# Patient Record
Sex: FEMALE | Race: ASIAN | NOT HISPANIC OR LATINO
[De-identification: names, ages, dates, MRNs, and addresses within clinical notes are randomized per-mention and may not be internally consistent; named-entity substitution may affect disease eponyms.]

---

## 2022-03-07 PROBLEM — Z00.00 ENCOUNTER FOR PREVENTIVE HEALTH EXAMINATION: Status: ACTIVE | Noted: 2022-03-07

## 2022-03-08 ENCOUNTER — APPOINTMENT (OUTPATIENT)
Dept: CARDIOLOGY | Facility: CLINIC | Age: 72
End: 2022-03-08
Payer: MEDICARE

## 2022-03-08 VITALS
BODY MASS INDEX: 26.5 KG/M2 | RESPIRATION RATE: 18 BRPM | OXYGEN SATURATION: 97 % | TEMPERATURE: 97.7 F | HEART RATE: 65 BPM | DIASTOLIC BLOOD PRESSURE: 67 MMHG | WEIGHT: 161 LBS | HEIGHT: 65.35 IN | SYSTOLIC BLOOD PRESSURE: 116 MMHG

## 2022-03-08 DIAGNOSIS — R55 SYNCOPE AND COLLAPSE: ICD-10-CM

## 2022-03-08 PROCEDURE — 93306 TTE W/DOPPLER COMPLETE: CPT

## 2022-03-08 PROCEDURE — 99204 OFFICE O/P NEW MOD 45 MIN: CPT

## 2022-03-21 ENCOUNTER — FORM ENCOUNTER (OUTPATIENT)
Age: 72
End: 2022-03-21

## 2022-03-28 PROBLEM — R55 NEAR SYNCOPE: Status: ACTIVE | Noted: 2022-03-08

## 2022-04-12 ENCOUNTER — NON-APPOINTMENT (OUTPATIENT)
Age: 72
End: 2022-04-12

## 2022-10-24 ENCOUNTER — APPOINTMENT (OUTPATIENT)
Dept: CARDIOLOGY | Facility: CLINIC | Age: 72
End: 2022-10-24

## 2022-10-24 VITALS
RESPIRATION RATE: 18 BRPM | HEART RATE: 66 BPM | BODY MASS INDEX: 26.33 KG/M2 | SYSTOLIC BLOOD PRESSURE: 146 MMHG | DIASTOLIC BLOOD PRESSURE: 74 MMHG | HEIGHT: 64.96 IN | OXYGEN SATURATION: 100 % | WEIGHT: 158 LBS | TEMPERATURE: 97.3 F

## 2022-10-24 DIAGNOSIS — E78.5 HYPERLIPIDEMIA, UNSPECIFIED: ICD-10-CM

## 2022-10-24 PROCEDURE — 99214 OFFICE O/P EST MOD 30 MIN: CPT | Mod: 25

## 2022-10-24 PROCEDURE — 93015 CV STRESS TEST SUPVJ I&R: CPT

## 2022-10-24 NOTE — HISTORY OF PRESENT ILLNESS
[FreeTextEntry1] : 72 year-old female with HTN, HLD, breast CA (stage 0), osteoporosis, presents for evaluation of abnormal ECG.  Patient reports no cardiac history.  Patient was noted to have an abnormal ECG by PCP, showing inverted T waves V2.  Patient denies CP, SOB, palpitations, or lightheadedness.  Patient denies h/o syncope.  Patient reports episodes of dizziness, described as loss of balance, occurring 3 times a week.  I advised patient to undergo an echocardiogram.  I advised patient to wear a 7-day monitor.

## 2022-10-31 PROBLEM — E78.5 HLD (HYPERLIPIDEMIA): Status: ACTIVE | Noted: 2022-03-28

## 2023-03-02 ENCOUNTER — APPOINTMENT (OUTPATIENT)
Dept: CARDIOLOGY | Facility: CLINIC | Age: 73
End: 2023-03-02
Payer: MEDICARE

## 2023-03-02 VITALS
SYSTOLIC BLOOD PRESSURE: 122 MMHG | DIASTOLIC BLOOD PRESSURE: 74 MMHG | OXYGEN SATURATION: 96 % | HEART RATE: 62 BPM | BODY MASS INDEX: 25.82 KG/M2 | WEIGHT: 155 LBS | RESPIRATION RATE: 18 BRPM

## 2023-03-02 DIAGNOSIS — I10 ESSENTIAL (PRIMARY) HYPERTENSION: ICD-10-CM

## 2023-03-02 PROCEDURE — 99214 OFFICE O/P EST MOD 30 MIN: CPT

## 2023-03-02 RX ORDER — LOSARTAN POTASSIUM 100 MG/1
100 TABLET, FILM COATED ORAL DAILY
Qty: 30 | Refills: 5 | Status: ACTIVE | COMMUNITY
Start: 2023-03-02 | End: 1900-01-01

## 2023-03-02 RX ORDER — LOSARTAN POTASSIUM AND HYDROCHLOROTHIAZIDE 12.5; 1 MG/1; MG/1
100-12.5 TABLET ORAL DAILY
Qty: 30 | Refills: 5 | Status: DISCONTINUED | COMMUNITY
Start: 2022-10-18 | End: 2023-03-02

## 2023-03-02 NOTE — HISTORY OF PRESENT ILLNESS
[FreeTextEntry1] : 3/2/23 - Pt reports 3 episodes of dizziness, described as spinning, while walking on the street. She had to hold one something to prevent falling. It usually last a few minutes. It's associated w. nausea sometimes. Pt reports stable DUQUE. Pt denies CP. Her BP is 100-.  I advised patient to stop Losartan 100 -12.5  mg and only take Losartan 100 mg. \par \par 10/24/22 - Patient reports SOB going upstairs.  Patient denies CP.  Patient denies palpitations.  Recent  with triglyceride 368.   Patient underwent a treadmill stress test and completed 5.5 minutes of Emmanuel protocol.  There was no ECG evidence of ischemia.  Following treadmill stress, there was no echocardiographic evidence of ischemia. \par \par 3/8/22  - Patient reports no cardiac history.  Patient was noted to have an abnormal ECG by PCP, showing inverted T waves V2.  Patient denies CP, SOB, palpitations, or lightheadedness.  Patient denies h/o syncope.  Patient reports episodes of dizziness, described as loss of balance, occurring 3 times a week.  I advised patient to undergo an echocardiogram.  Patient underwent an echocardiogram and it showed normal LV function without significant valvular pathology.   I advised patient to wear a 7-day monitor.  Patient wore a 7-day monitor and it showed Average HR 63, rare PVCs, rare PACs, couplets, and 3 PATs, the longest 7 beats at a rate of 130, the fastest 4 beats at a rate of 136. Pt verbalized understanding. \par

## 2023-03-02 NOTE — HISTORY OF PRESENT ILLNESS
[FreeTextEntry1] : 10/24/22 - Patient reports SOB going upsatirs.  Patient denies CP.  Patient denies palpitations.  Recent  with triglyceride 368.   Patient underwent a treadmill stress test and completed 5.5 minutes of Emmanuel protocol.  There was no ECG evidence of ischemia.  Following treadmill stress, there was no echocardiographic evidence of ischemia. \par \par 3/8/22  - Patient reports no cardiac history.  Patient was noted to have an abnormal ECG by PCP, showing inverted T waves V2.  Patient denies CP, SOB, palpitations, or lightheadedness.  Patient denies h/o syncope.  Patient reports episodes of dizziness, described as loss of balance, occurring 3 times a week.  I advised patient to undergo an echocardiogram.  Patient underwent an echocardiogram and it showed normal LV function without significant valvular pathology.   I advised patient to wear a 7-day monitor.  Patient wore a 7-day monitor and it showed Average HR 63, rare PVCs, rare PACs, couplets, and 3 PATs, the longest 7 beats at a rate of 130, the fastest 4 beats at a rate of 136. Pt verbalized understanding. \par

## 2023-03-02 NOTE — REASON FOR VISIT
[FreeTextEntry1] : 72 year-old female with HTN, HLD, breast CA (stage 0), osteoporosis, presents for followup.  \par \par Patient was last seen on 10/24/22 for followup.  Patient reported SOB going upstairs.  Patient underwent a treadmill stress test and completed 5.5 minutes of Emmanuel protocol.  There was no ECG evidence of ischemia.  Following treadmill stress, there was no echocardiographic evidence of ischemia. \par \par She is on Plavix 75 mg for cardioprotection.  She is on Losartan HCTZ 100-25 mg for HTN.  She is on Rosuvastatin 5 mg and Fenofibrate 134 mg for HLD.  \par \par Patient underwent an echocardiogram 3/8/22 and it showed normal LV function without significant valvular pathology.  \par \par Patient wore a 7-day monitor 3/8/22 and it showed Average HR 63, rare PVCs, rare PACs, couplets, and 3 PATs, the longest 7 beats at a rate of 130, the fastest 4 beats at a rate of 136.  \par \par

## 2023-03-02 NOTE — REASON FOR VISIT
[FreeTextEntry1] : 72 year-old female with HTN, HLD, breast CA (stage 0), osteoporosis, presents for followup.  \par \par Patient was last seen on 3/8/22 for evaluation of abnormal ECG.   I advised patient to undergo an echocardiogram.  Patient underwent an echocardiogram and it showed normal LV function without significant valvular pathology.   I advised patient to wear a 7-day monitor.  Patient wore a 7-day monitor and it showed Average HR 63, rare PVCs, rare PACs, couplets, and 3 PATs, the longest 7 beats at a rate of 130, the fastest 4 beats at a rate of 136.  \par \par She is on Plavix 75 mg for cardioprotection.  She is on Losartan HCTZ 100-25 mg for HTN.  She is on Rosuvastatin 5 mg and Fenofibrate 134 mg for HLD.

## 2023-03-15 ENCOUNTER — FORM ENCOUNTER (OUTPATIENT)
Age: 73
End: 2023-03-15

## 2023-03-24 ENCOUNTER — NON-APPOINTMENT (OUTPATIENT)
Age: 73
End: 2023-03-24

## 2023-07-12 ENCOUNTER — APPOINTMENT (OUTPATIENT)
Dept: CARDIOLOGY | Facility: CLINIC | Age: 73
End: 2023-07-12
Payer: MEDICARE

## 2023-07-12 VITALS
DIASTOLIC BLOOD PRESSURE: 77 MMHG | SYSTOLIC BLOOD PRESSURE: 146 MMHG | BODY MASS INDEX: 24.99 KG/M2 | HEART RATE: 67 BPM | WEIGHT: 150 LBS | TEMPERATURE: 96.8 F | OXYGEN SATURATION: 97 % | RESPIRATION RATE: 16 BRPM

## 2023-07-12 DIAGNOSIS — R07.9 CHEST PAIN, UNSPECIFIED: ICD-10-CM

## 2023-07-12 DIAGNOSIS — R06.02 SHORTNESS OF BREATH: ICD-10-CM

## 2023-07-12 DIAGNOSIS — Z86.79 PERSONAL HISTORY OF OTHER DISEASES OF THE CIRCULATORY SYSTEM: ICD-10-CM

## 2023-07-12 DIAGNOSIS — R42 DIZZINESS AND GIDDINESS: ICD-10-CM

## 2023-07-12 PROCEDURE — 99214 OFFICE O/P EST MOD 30 MIN: CPT

## 2023-07-22 PROBLEM — R06.02 SOB (SHORTNESS OF BREATH): Status: ACTIVE | Noted: 2023-07-22

## 2023-07-22 PROBLEM — Z86.79 HISTORY OF ABNORMAL ELECTROCARDIOGRAPHY: Status: RESOLVED | Noted: 2022-03-28 | Resolved: 2023-07-22

## 2023-07-22 PROBLEM — R07.9 CHEST PAIN, UNSPECIFIED TYPE: Status: ACTIVE | Noted: 2023-07-22

## 2023-07-22 PROBLEM — R42 DIZZINESS: Status: ACTIVE | Noted: 2022-03-28

## 2023-07-22 NOTE — HISTORY OF PRESENT ILLNESS
[FreeTextEntry1] : 7/12/23 - Patient had brain CHERRY which showed 3 mm L cavernous carotid aneurysm. Patient may stop Plavix 75 mg. \par \par 3/2/23 - Pt reports 3 episodes of dizziness, described as spinning, while walking on the street. She had to hold one something to prevent falling. It usually last a few minutes. It's associated w. nausea sometimes. Pt reports stable DUQUE. Pt denies CP. Her BP is 100-.  I advised patient to stop Losartan 100 -12.5  mg and only take Losartan 100 mg. \par \par 10/24/22 - Patient reports SOB going upstairs.  Patient denies CP.  Patient denies palpitations.  Recent  with triglyceride 368.   Patient underwent a treadmill stress test and completed 5.5 minutes of Emmanuel protocol.  There was no ECG evidence of ischemia.  Following treadmill stress, there was no echocardiographic evidence of ischemia. \par \par 3/8/22  - Patient reports no cardiac history.  Patient was noted to have an abnormal ECG by PCP, showing inverted T waves V2.  Patient denies CP, SOB, palpitations, or lightheadedness.  Patient denies h/o syncope.  Patient reports episodes of dizziness, described as loss of balance, occurring 3 times a week.  I advised patient to undergo an echocardiogram.  Patient underwent an echocardiogram and it showed normal LV function without significant valvular pathology.   I advised patient to wear a 7-day monitor.  Patient wore a 7-day monitor and it showed Average HR 63, rare PVCs, rare PACs, couplets, and 3 PATs, the longest 7 beats at a rate of 130, the fastest 4 beats at a rate of 136. Pt verbalized understanding. \par

## 2023-07-22 NOTE — PHYSICAL EXAM
[Well Nourished] : well nourished [Well Developed] : well developed [No Acute Distress] : no acute distress [Normal Conjunctiva] : normal conjunctiva [Normal Venous Pressure] : normal venous pressure [No Carotid Bruit] : no carotid bruit [Normal S1, S2] : normal S1, S2 [No Murmur] : no murmur [No Rub] : no rub [No Gallop] : no gallop [Clear Lung Fields] : clear lung fields [No Respiratory Distress] : no respiratory distress  [Good Air Entry] : good air entry [Soft] : abdomen soft [Non Tender] : non-tender [No Masses/organomegaly] : no masses/organomegaly [Normal Bowel Sounds] : normal bowel sounds [Normal Gait] : normal gait [No Edema] : no edema [No Cyanosis] : no cyanosis [No Clubbing] : no clubbing [No Varicosities] : no varicosities [Moves all extremities] : moves all extremities [No Focal Deficits] : no focal deficits [Normal Speech] : normal speech [Alert and Oriented] : alert and oriented [Normal memory] : normal memory

## 2024-08-05 PROBLEM — R91.8 MULTIPLE LUNG NODULES: Status: ACTIVE | Noted: 2024-08-05

## 2024-08-07 ENCOUNTER — NON-APPOINTMENT (OUTPATIENT)
Age: 74
End: 2024-08-07

## 2024-08-08 ENCOUNTER — APPOINTMENT (OUTPATIENT)
Dept: THORACIC SURGERY | Facility: CLINIC | Age: 74
End: 2024-08-08

## 2024-08-08 PROBLEM — Z80.1 FAMILY HISTORY OF LUNG CANCER: Status: ACTIVE | Noted: 2024-08-08

## 2024-08-08 PROBLEM — Z77.22 HISTORY OF SECOND HAND SMOKE EXPOSURE: Status: ACTIVE | Noted: 2024-08-08

## 2024-08-08 PROBLEM — Z85.3 HISTORY OF MALIGNANT NEOPLASM OF LEFT FEMALE BREAST, UNSPECIFIED ESTROGEN RECEPTOR STATUS, UNSPECIFIED SITE OF BREAST: Status: RESOLVED | Noted: 2024-08-08 | Resolved: 2024-08-08

## 2024-08-08 PROCEDURE — 99204 OFFICE O/P NEW MOD 45 MIN: CPT

## 2024-08-08 NOTE — PHYSICAL EXAM
[Restricted in physically strenuous activity but ambulatory and able to carry out work of a light or sedentary nature] : Status 1- Restricted in physically strenuous activity but ambulatory and able to carry out work of a light or sedentary nature, e.g., light house work, office work [General Appearance - Alert] : alert [General Appearance - In No Acute Distress] : in no acute distress [Sclera] : the sclera and conjunctiva were normal [PERRL With Normal Accommodation] : pupils were equal in size, round, and reactive to light [Extraocular Movements] : extraocular movements were intact [Outer Ear] : the ears and nose were normal in appearance [Oropharynx] : the oropharynx was normal [Neck Appearance] : the appearance of the neck was normal [Neck Cervical Mass (___cm)] : no neck mass was observed [Jugular Venous Distention Increased] : there was no jugular-venous distention [Thyroid Diffuse Enlargement] : the thyroid was not enlarged [Thyroid Nodule] : there were no palpable thyroid nodules [Auscultation Breath Sounds / Voice Sounds] : lungs were clear to auscultation bilaterally [Heart Rate And Rhythm] : heart rate was normal and rhythm regular [Heart Sounds] : normal S1 and S2 [Heart Sounds Gallop] : no gallops [Murmurs] : no murmurs [Heart Sounds Pericardial Friction Rub] : no pericardial rub [Examination Of The Chest] : the chest was normal in appearance [Chest Visual Inspection Thoracic Asymmetry] : no chest asymmetry [Diminished Respiratory Excursion] : normal chest expansion [2+] : left 2+ [Bowel Sounds] : normal bowel sounds [Abdomen Soft] : soft [Abdomen Tenderness] : non-tender [Abdomen Mass (___ Cm)] : no abdominal mass palpated [Cervical Lymph Nodes Enlarged Posterior Bilaterally] : posterior cervical [Cervical Lymph Nodes Enlarged Anterior Bilaterally] : anterior cervical [Supraclavicular Lymph Nodes Enlarged Bilaterally] : supraclavicular [No CVA Tenderness] : no ~M costovertebral angle tenderness [No Spinal Tenderness] : no spinal tenderness [Abnormal Walk] : normal gait [Nail Clubbing] : no clubbing  or cyanosis of the fingernails [Musculoskeletal - Swelling] : no joint swelling seen [Motor Tone] : muscle strength and tone were normal [Skin Color & Pigmentation] : normal skin color and pigmentation [Skin Turgor] : normal skin turgor [] : no rash

## 2024-08-09 NOTE — ASSESSMENT
[FreeTextEntry1] : Ms. LU KUNZ, 73 year old female, never smoker, w/ hx of HTN (on Plavix), DMT2, Stg 0 breast CA s/p Lt breast lumpectomy followed by Targeted therapy x 5 yrs, followed by Hem/Onc Dr. Anderson Díaz, who presented with increasing size lung nodules, referred by PCP Dr. Paul Dougherty.   I have reviewed the patient's medical records and diagnostic images at time of this office consultation and have made the following recommendation: 1. CT scan reviewed, few small lung nodules that are stable from May 2023, I recommended continuous surveillance, RTC in 1 year with CT Chest w/o contrast.    I, CAMMY Hines, personally performed the evaluation and management (E/M) services for this established patient who presents today with (a) new problem(s)/exacerbation of (an) existing condition(s). That E/M includes conducting the examination, assessing all new/exacerbated conditions, and establishing a new plan of care. Today, my ACP, Inge Blakely NP was here to observe my evaluation and management services for this new problem/exacerbated condition to be followed going forward.

## 2024-08-09 NOTE — CONSULT LETTER
[Dear  ___] : Dear  [unfilled], [Consult Letter:] : I had the pleasure of evaluating your patient, [unfilled]. [( Thank you for referring [unfilled] for consultation for _____ )] : Thank you for referring [unfilled] for consultation for [unfilled] [Please see my note below.] : Please see my note below. [Consult Closing:] : Thank you very much for allowing me to participate in the care of this patient.  If you have any questions, please do not hesitate to contact me. [Sincerely,] : Sincerely, [DrMelania  ___] : Dr. BARLOW [FreeTextEntry2] : Dr. Paul Dougherty (PCP/Referring) Dr. Anderson Díaz (Hem/Onc) [FreeTextEntry3] : Joselo Joshua MD, MPH  System Director of Thoracic Surgery  Director of Comprehensive Lung and Foregut [place]  Professor Cardiovascular & Thoracic Surgery   [employer] at [facility] [phone] [place] [address] [place] [address] Tel: [phone] Fax: [phone]

## 2024-08-09 NOTE — CONSULT LETTER
[Dear  ___] : Dear  [unfilled], [Consult Letter:] : I had the pleasure of evaluating your patient, [unfilled]. [( Thank you for referring [unfilled] for consultation for _____ )] : Thank you for referring [unfilled] for consultation for [unfilled] [Please see my note below.] : Please see my note below. [Consult Closing:] : Thank you very much for allowing me to participate in the care of this patient.  If you have any questions, please do not hesitate to contact me. [Sincerely,] : Sincerely, [DrMelania  ___] : Dr. BARLOW [FreeTextEntry2] : Dr. Paul Dougherty (PCP/Referring) Dr. Anderson Díaz (Hem/Onc) [FreeTextEntry3] : Joselo Joshua MD, MPH  System Director of Thoracic Surgery  Director of Comprehensive Lung and Foregut [place]  Professor Cardiovascular & Thoracic Surgery   [facility] [employer] [facility] at [facility] [phone] [place] [address] [place] [address] Tel: [phone] Fax: [phone]

## 2024-08-09 NOTE — HISTORY OF PRESENT ILLNESS
[FreeTextEntry1] : Ms. LU KUNZ, 73 year old female, never smoker, w/ hx of HTN (on Plavix), DMT2, Stg 0 breast CA s/p Lt breast lumpectomy followed by Targeted therapy x 5 yrs, followed by Hem/Onc Dr. Anderson Díaz, who presented with increasing size lung nodules, referred by PCP Dr. Paul Dougherty.  CT Chest on 7/6/24 at [facility]: - mild multi-focal sub-segmental atelectasis, but there is no focal site of pulmonary airspace consolidation.  - Right lower lobe clustered pulmonary nodularity has improved since May 23, 2023 and in retrospect was most likely infectious and/or inflammatory in etiology. - Multiple other more discrete pulmonary nodules measuring up to 7 mm in size are unchanged since May 23, 2023; as one selected example, there is an unchanged 7 mm left lower lobe pulmonary nodule (series 3, image 238) and an unchanged 3 mm left lower lobe pulmonary nodule (series 3, image 208). - There is persistent mild non-specific biapical pleuroparenchymal thickening/scarring.  - There is no pleural effusion or pneumothorax on either side. - a 5 mm perihepatic calcification that may be related to prior granulomatous disease (series 2, image 75) and a 4.0 cm right renal cyst (series 2, image 109).  Patient is here today for CT Sx consultation. Denies SOB, CP, cough.   **PHQ-2 completed on 8/8/24.

## 2025-02-09 ENCOUNTER — NON-APPOINTMENT (OUTPATIENT)
Age: 75
End: 2025-02-09

## 2025-07-17 ENCOUNTER — APPOINTMENT (OUTPATIENT)
Dept: THORACIC SURGERY | Facility: CLINIC | Age: 75
End: 2025-07-17
Payer: MEDICARE

## 2025-07-17 PROCEDURE — 99213 OFFICE O/P EST LOW 20 MIN: CPT | Mod: 93

## 2025-08-16 ENCOUNTER — APPOINTMENT (OUTPATIENT)
Dept: CARDIOLOGY | Facility: CLINIC | Age: 75
End: 2025-08-16

## 2025-08-16 VITALS
WEIGHT: 157 LBS | OXYGEN SATURATION: 97 % | SYSTOLIC BLOOD PRESSURE: 111 MMHG | DIASTOLIC BLOOD PRESSURE: 67 MMHG | RESPIRATION RATE: 14 BRPM | BODY MASS INDEX: 26.16 KG/M2 | HEART RATE: 79 BPM

## 2025-08-16 PROCEDURE — 93306 TTE W/DOPPLER COMPLETE: CPT

## 2025-09-10 ENCOUNTER — APPOINTMENT (OUTPATIENT)
Dept: CARDIOLOGY | Facility: CLINIC | Age: 75
End: 2025-09-10
Payer: MEDICARE

## 2025-09-10 VITALS
WEIGHT: 158 LBS | RESPIRATION RATE: 16 BRPM | SYSTOLIC BLOOD PRESSURE: 102 MMHG | BODY MASS INDEX: 26.32 KG/M2 | DIASTOLIC BLOOD PRESSURE: 62 MMHG | OXYGEN SATURATION: 95 % | HEART RATE: 72 BPM

## 2025-09-10 DIAGNOSIS — I25.10 ATHEROSCLEROTIC HEART DISEASE OF NATIVE CORONARY ARTERY W/OUT ANGINA PECTORIS: ICD-10-CM

## 2025-09-10 PROCEDURE — 99214 OFFICE O/P EST MOD 30 MIN: CPT

## 2025-09-10 PROCEDURE — G2211 COMPLEX E/M VISIT ADD ON: CPT
